# Patient Record
Sex: MALE | Race: OTHER | NOT HISPANIC OR LATINO | URBAN - METROPOLITAN AREA
[De-identification: names, ages, dates, MRNs, and addresses within clinical notes are randomized per-mention and may not be internally consistent; named-entity substitution may affect disease eponyms.]

---

## 2017-01-19 ENCOUNTER — EMERGENCY (EMERGENCY)
Facility: HOSPITAL | Age: 32
LOS: 1 days | Discharge: HOME | End: 2017-01-19
Attending: EMERGENCY MEDICINE | Admitting: EMERGENCY MEDICINE
Payer: SELF-PAY

## 2017-01-19 VITALS
DIASTOLIC BLOOD PRESSURE: 86 MMHG | OXYGEN SATURATION: 97 % | SYSTOLIC BLOOD PRESSURE: 118 MMHG | RESPIRATION RATE: 16 BRPM | HEART RATE: 90 BPM | TEMPERATURE: 98 F | WEIGHT: 165.35 LBS

## 2017-01-19 DIAGNOSIS — T15.91XA FOREIGN BODY ON EXTERNAL EYE, PART UNSPECIFIED, RIGHT EYE, INITIAL ENCOUNTER: ICD-10-CM
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
## 2017-01-19 DIAGNOSIS — H57.11 OCULAR PAIN, RIGHT EYE: ICD-10-CM
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
## 2017-01-19 PROCEDURE — 99284 EMERGENCY DEPT VISIT MOD MDM: CPT

## 2017-01-19 NOTE — ED ADULT TRIAGE NOTE - CHIEF COMPLAINT QUOTE
piece of metal in right eye when using  - uncorrected  Rt eye 20/40; uncorrected left eye 20/25; does wear glasses but "they are in the car"

## 2017-01-19 NOTE — ED ADULT NURSE NOTE - CHPI ED SYMPTOMS NEG
no numbness/no blurred vision/no vomiting/no fever/no syncope/no nausea/no loss of consciousness/no change in level of consciousness/no chills

## 2017-01-19 NOTE — ED PROVIDER NOTE - MEDICAL DECISION MAKING DETAILS
30 y/o m with metallic foreign body in right eye; visual acuity intact, occurred at 10AM today, spoke to Dr. Polanco ophthalmology at [facility], will send pt to her for evaluation.

## 2017-01-19 NOTE — ED ADULT NURSE NOTE - OBJECTIVE STATEMENT
Pt walked into ED with c/o of right foreign metal object with redness and tearing noted. Pt. states onset was this morning at 9 am after using a metal machine. PT. denies taking anything for the pain. PT. denies blurred vision, double vision.

## 2017-01-19 NOTE — ED PROVIDER NOTE - OBJECTIVE STATEMENT
32 y/o m no pmh presents c/o pain to right eye, tearing after metal shard flew into his eye while working.  Pt was drilling metal, reportedly wearing eye protection, but metal still got in, happened at ~10AM.

## 2017-01-19 NOTE — ED PROVIDER NOTE - EYES, MLM
right eye tearing, +sclera injection, PERRLA, EOMI, fluorescein stain shows small foreign body to cornea at 7 o'clock, visual acuity 20/30 uncorrected

## 2018-05-30 ENCOUNTER — EMERGENCY (EMERGENCY)
Facility: HOSPITAL | Age: 33
LOS: 1 days | Discharge: HOME | End: 2018-05-30
Attending: EMERGENCY MEDICINE | Admitting: EMERGENCY MEDICINE
Payer: SELF-PAY

## 2018-05-30 VITALS
TEMPERATURE: 98 F | HEART RATE: 84 BPM | SYSTOLIC BLOOD PRESSURE: 117 MMHG | HEIGHT: 68 IN | RESPIRATION RATE: 18 BRPM | OXYGEN SATURATION: 96 % | WEIGHT: 179.9 LBS | DIASTOLIC BLOOD PRESSURE: 82 MMHG

## 2018-05-30 PROCEDURE — 99284 EMERGENCY DEPT VISIT MOD MDM: CPT | Mod: 25

## 2018-05-30 PROCEDURE — 99284 EMERGENCY DEPT VISIT MOD MDM: CPT

## 2018-05-30 PROCEDURE — 70480 CT ORBIT/EAR/FOSSA W/O DYE: CPT | Mod: 26

## 2018-05-30 PROCEDURE — 70480 CT ORBIT/EAR/FOSSA W/O DYE: CPT

## 2018-05-30 RX ORDER — ERYTHROMYCIN BASE 5 MG/GRAM
1 OINTMENT (GRAM) OPHTHALMIC (EYE) ONCE
Qty: 0 | Refills: 0 | Status: COMPLETED | OUTPATIENT
Start: 2018-05-30 | End: 2018-05-30

## 2018-05-30 RX ORDER — ERYTHROMYCIN BASE 5 MG/GRAM
1 OINTMENT (GRAM) OPHTHALMIC (EYE)
Qty: 3.5 | Refills: 0 | OUTPATIENT
Start: 2018-05-30 | End: 2018-06-03

## 2018-05-30 RX ORDER — IBUPROFEN 200 MG
600 TABLET ORAL ONCE
Qty: 0 | Refills: 0 | Status: COMPLETED | OUTPATIENT
Start: 2018-05-30 | End: 2018-05-30

## 2018-05-30 RX ADMIN — Medication 600 MILLIGRAM(S): at 17:48

## 2018-05-30 RX ADMIN — Medication 1 APPLICATION(S): at 19:31

## 2018-05-30 NOTE — ED PROVIDER NOTE - MEDICAL DECISION MAKING DETAILS
corneal abrasion.  fb not seen.  eye irrigated.  ct done to eval deep metal fragment given mechanism of cutting metal pipe and neg.  home with erythromycin ointment and prn optho if symptoms persist

## 2018-05-30 NOTE — ED PROVIDER NOTE - OBJECTIVE STATEMENT
here with foreign body sensation/ pain in left eye after using sawsall on metal pipe.  Was wearing eye protection but says head was turned to side.  Vision unchanged.  Pain with movements of eye

## 2018-05-30 NOTE — ED PROVIDER NOTE - EYES, MLM
pupils equal, round and reactive to light.  left eye injected.  clear tearing.  no visualized foreign body.  lids everted.  fluorescein + with small area of uptake on medial cornea.  vision normal

## 2018-05-30 NOTE — ED ADULT NURSE NOTE - OBJECTIVE STATEMENT
was sawing metal , was wearing goggles and metal pieces got into his eyes, especially the left eye; did wash eyes with water PTA;
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
## 2018-06-03 DIAGNOSIS — H57.12 OCULAR PAIN, LEFT EYE: ICD-10-CM
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
## 2018-06-03 DIAGNOSIS — S05.02XA INJURY OF CONJUNCTIVA AND CORNEAL ABRASION WITHOUT FOREIGN BODY, LEFT EYE, INITIAL ENCOUNTER: ICD-10-CM
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
## 2020-05-12 NOTE — ED ADULT TRIAGE NOTE - STATUS:
"Blood sugars in [facility] encounter from 5/11/20 as attachment.    Obed Wiley is a 33 year old male who is being evaluated via a billable telephone visit.      The patient has been notified of following:     \"This telephone visit will be conducted via a call between you and your physician/provider. We have found that certain health care needs can be provided without the need for a physical exam.  This service lets us provide the care you need with a short phone conversation.  If a prescription is necessary we can send it directly to your pharmacy.  If lab work is needed we can place an order for that and you can then stop by our lab to have the test done at a later time.    Telephone visits are billed at different rates depending on your insurance coverage. During this emergency period, for some insurers they may be billed the same as an in-person visit.  Please reach out to your insurance provider with any questions.    If during the course of the call the physician/provider feels a telephone visit is not appropriate, you will not be charged for this service.\"    Patient has given verbal consent for Telephone visit?  Yes    What phone number would you like to be contacted at? [phone]    How would you like to obtain your AVS? [facility]     Endocrinology Telephone/virtual Visit    Chief Complaint: Consult and Diabetes     Information obtained from:Patient    Subjective:         HPI: Obed Wiley is a 33 year old male with history of diabetes who is seen in consultation at Anjelica Reyes's request for the same.    This is patient with complex medical history who was diagnosed with diabetes about 4 years ago.  He has been on insulin since then.  Current antidiabetic medication is as follows-  Lantus 15 units in the morning and 5 units at bedtime  Correction scale 2 hours after eating which involves-blood sugar 150 take 2 units, blood sugar 200 take 4 units, for blood sugar above 401 unit for every 50.    He is " very much frustrated because every time he eats his blood sugars goes up and has to ronal it down with a correction scale.  The main reason this appointment is a scheduled is to see if there is a way to avoid high blood sugars.  No history of DKA.  He has history of pancreatitis.  He tries to limit carbohydrate intake with meals to 20-30 g per meal.  Limits snacks to about 15 g per meal.  Because of his history of gastric bypass surgery-he usually eats multiple meals throughout the day.  As documented below in the copied document:    His fasting blood sugar readings are usually ranging between .  Postprandial blood sugars range between 200s-400s depending on the meals he eats.    He has scanned his blood sugar readings and has sent to us which is copied below.               Allergies   Allergen Reactions     Bee Anaphylaxis     Adhesive Tape Rash     Sulfa Drugs Itching     Morphine        Current Outpatient Medications   Medication Sig Dispense Refill     blood glucose (NO BRAND SPECIFIED) lancets standard Use to test blood sugar 6 times daily or as directed.       Calcium Carbonate Antacid 400 MG CHEW Take 400 mg by mouth 2 times daily        cholecalciferol (VITAMIN D3) 45585 units (1250 mcg) capsule Take 50,000 Units by mouth every 7 days Saturdays       ciprofloxacin (CIPRO) 500 MG tablet Take 500 mg by mouth daily       diclofenac (VOLTAREN) 1 % topical gel Place onto the skin 4 times daily as needed for moderate pain       enoxaparin ANTICOAGULANT (LOVENOX) 100 MG/ML syringe Inject 0.88 mLs (88 mg) Subcutaneous every 24 hours for 4 doses 3.52 mL 0     ferrous sulfate (FEROSUL) 325 (65 Fe) MG tablet Take 325 mg by mouth daily (with breakfast)       folic acid (FOLVITE) 1 MG tablet Take 1 mg by mouth 2 times daily       furosemide (LASIX) 40 MG tablet Take 40 mg by mouth 3 times daily       gabapentin (NEURONTIN) 400 MG capsule Take 400 mg by mouth 3 times daily       insulin aspart (NOVOLOG FLEXPEN) 100  UNIT/ML pen Inject Subcutaneous 3 times daily (with meals) Sliding scale as directed       insulin glargine (LANTUS PEN) 100 UNIT/ML pen Inject 26 Units Subcutaneous 2 times daily       lactulose (CHRONULAC) 10 GM/15ML solution Take 15 mLs (10 g) by mouth every hour as needed for constipation 946 mL 0     magnesium oxide 400 MG CAPS Take 400 mg by mouth 3 times daily        MULTIPLE VITAMIN-FOLIC ACID PO Take 1 tablet by mouth 1 daily       Nutritional Supplements (ENSURE PO) Ensure/ boost - 237 ml bid daily       oxyCODONE-acetaminophen (PERCOCET)  MG per tablet Take 1 tablet by mouth every 4 hours as needed for moderate to severe pain        pantoprazole (PROTONIX) 40 MG EC tablet Take 40 mg by mouth daily       polyethylene glycol (MIRALAX/GLYCOLAX) packet Take 1 packet by mouth daily        promethazine (PHENERGAN) 12.5 MG tablet Take 25 mg by mouth every 6 hours as needed for nausea       QUEtiapine (SEROQUEL) 25 MG tablet Take 50 mg by mouth At Bedtime       QUEtiapine (SEROQUEL) 25 MG tablet Take 25 mg by mouth daily as needed        rifaximin (XIFAXAN) 550 MG TABS tablet Take 550 mg by mouth 2 times daily        senna (SENOKOT) 8.6 MG tablet Take 1 tablet by mouth daily as needed for constipation       spironolactone (ALDACTONE) 100 MG tablet Take 100 mg by mouth 3 times daily       traZODone (DESYREL) 50 MG tablet Take 100 mg by mouth At Bedtime Prn        tretinoin (RETIN-A) 0.025 % external cream Apply topically At Bedtime       Vitamin D, Cholecalciferol, 25 MCG (1000 UT) CAPS Take 2,000 Units by mouth daily        lactulose (CHRONULAC) 10 GM/15ML solution Take 45 mLs (30 g) by mouth 3 times daily 20mg / 30 ml tid daily/ prn (Patient not taking: Reported on 4/27/2020)         Review of Systems     as per HPI above    Past Medical History:   Diagnosis Date     Alcoholic cirrhosis of liver with ascites (H) 12/23/2019     Diabetes (H)     Type 2 DM, Uses Insulin      DVT (deep vein thrombosis) in  pregnancy      H/O protein C deficiency      Hepatic encephalopathy (H)      Hepatitis     Hep A when an infant      History of blood transfusion     2019 at [facility]      Leukopenia 1/11/2020     SBP (spontaneous bacterial peritonitis) (H) 11/2019       Past Surgical History:   Procedure Laterality Date     ABDOMEN SURGERY      Gastric Bypass 2009. [facility]      CARDIAC SURGERY      IVC      COLONOSCOPY       ENT SURGERY      Tonsils and Adenoids Removed at 6-7 Years Old      GALLBLADDER SURGERY  2017     GI SURGERY      Upper GI        Family History   Problem Relation Age of Onset     Protein C deficiency Mother      Pancreatic Cancer Father      Deonte Parkinson White syndrome Father      Alcoholism Brother      Substance Abuse Brother      Heart Failure Maternal Grandmother      Heart Failure Maternal Grandfather          Objective:   Alert and oriented.  Pleasant.  In House Labs:       TSH   Date Value Ref Range Status   01/06/2020 4.09 (H) 0.40 - 4.00 mU/L Final     T4 Free   Date Value Ref Range Status   01/06/2020 0.97 0.76 - 1.46 ng/dL Final       Creatinine   Date Value Ref Range Status   01/11/2020 0.65 (L) 0.66 - 1.25 mg/dL Final   ]    Recent Labs   Lab Test 01/06/20  0831   CHOL 107   HDL 67   LDL 31   TRIG 49       No results found for: [id number], [id number], [id number]      Assessment/Treatment Plan:      Diabetes unspecified type: Based on his insulin need and sensitivity; he might be type 1 diabetes or with secondary diabetes.  To clarify this further we will going to check with his next blood work C-peptide, glucose and AKANKSHA antibodies.    In the meantime as documented above in the HPI his fasting blood sugars are within the normal limit however his postmeal blood sugar is high because of no coverage with meals.  Usually her blood sugars are changed after the fact with the correction scale.  I had a detailed discussion with him regarding diabetes management.  As a first step to avoid the  Applied blood sugar readings in the 300s and 400s postmeal; we will introduce mealtime insulin as documented below.  We are going to also formulate scheduled correction scale that will be implemented 3 times a day with main meals.  I have advised to reduce Lantus to 15 units daily from the current dose of 20 units daily because of the morning blood sugar fasting sometimes going in the 60s.  We will introduce mealtime coverage 1 unit for every 15 g of carbohydrate correctional scale 1 unit for every 65 above 151.  Will make further adjustments as we go along and will closely follow him up until blood sugars are under good control.  This patient benefits from having continuous glucose monitor-he is currently checking at least 4 times per day.  Going forward he needs to check his blood sugars multiple times due to pretibial diabetes and the use of multiple daily injections of insulin.  I have recommended stefano freestyle 14-day continuous glucose monitor and to this effect I have provided him with a letter that can help you get the prescription for CGM.  The following written information was shared via my chart with the patient.  Patient Instructions     Continue with Lantus 15 units daily, stop Lantus at bedtime 5 units daily.    Start mealtime insulin coverage with 1 unit of NovoLog for every 15 g of carbohydrates with meals 3 times per day.    Please follow this correction scale with meals 3 times a day  BG less than 151 - no additional insulin. Take only mealtime coverage.   -215 = additional 1 unit with mealtime insulin.   -280 = 2 units.  -345 = 3 units.  -410 = 4 units.  -475 = 5 units.  BG >475 = 6 units.    Please monitor your blood sugar at least 4 times per day and contact us if you have blood sugar below 70 on more than two occasions.       Please see the letter below (we will mail out this letter to you as well).     Patient:  Obed Wiley  :   1987  MRN:      [id number]        Mr.Aaron LUDMILA Wiley  [address] [facility] [address]      To whom it may concern    May 12, 2020      Mr. Wiley is under my care for diabetes management and has brittle diabetes which is currently being managed with multiple daily injections of insulin.  Due to wide fluctuations in his blood sugars, risk of hypoglycemia and the need to closely monitor his blood sugars multiple times throughout the day; I I have recommended a continuous glucose monitoring system for blood sugar monitoring.  To this effect; I highly appreciate your input in ordering the freestyle 14-day stefano device and sensors for Mr. Wiley.      Please do not hesitate to contact me if you need additional information.        Waylon Leon MD            I will contact the patient with the test results.  Return to clinic in 1.5 months.    Test and/or medications prescribed today:  Orders Placed This Encounter   Procedures     Glucose     C-peptide     Glutamic acid decarboxylase antibody     TSH with free T4 reflex         Waylon Leon MD  Staff Endocrinologist    [phone]  Division of Endocrinology and Diabetes          Phone call duration: 28 minutes

## 2020-07-21 ENCOUNTER — NON-APPOINTMENT (OUTPATIENT)
Age: 35
End: 2020-07-21

## 2020-07-21 ENCOUNTER — APPOINTMENT (OUTPATIENT)
Dept: OPHTHALMOLOGY | Facility: CLINIC | Age: 35
End: 2020-07-21
Payer: COMMERCIAL

## 2020-07-21 PROBLEM — Z00.00 ENCOUNTER FOR PREVENTIVE HEALTH EXAMINATION: Status: ACTIVE | Noted: 2020-07-21

## 2020-07-21 PROCEDURE — 65222 REMOVE FOREIGN BODY FROM EYE: CPT | Mod: LT

## 2020-07-24 ENCOUNTER — APPOINTMENT (OUTPATIENT)
Dept: OPHTHALMOLOGY | Facility: CLINIC | Age: 35
End: 2020-07-24

## 2021-04-02 ENCOUNTER — OUTPATIENT (OUTPATIENT)
Dept: OUTPATIENT SERVICES | Facility: HOSPITAL | Age: 36
LOS: 1 days | End: 2021-04-02
Payer: COMMERCIAL

## 2021-04-02 PROCEDURE — U0003: CPT

## 2021-04-02 PROCEDURE — U0005: CPT

## 2021-04-03 LAB — SARS-COV-2 RNA SPEC QL NAA+PROBE: SIGNIFICANT CHANGE UP

## 2021-04-06 DIAGNOSIS — Z11.52 ENCOUNTER FOR SCREENING FOR COVID-19: ICD-10-CM

## 2022-02-23 ENCOUNTER — NON-APPOINTMENT (OUTPATIENT)
Age: 37
End: 2022-02-23

## 2022-02-23 ENCOUNTER — APPOINTMENT (OUTPATIENT)
Dept: OPHTHALMOLOGY | Facility: CLINIC | Age: 37
End: 2022-02-23
Payer: COMMERCIAL

## 2022-02-23 PROCEDURE — 92012 INTRM OPH EXAM EST PATIENT: CPT

## 2022-02-25 ENCOUNTER — APPOINTMENT (OUTPATIENT)
Dept: OPHTHALMOLOGY | Facility: CLINIC | Age: 37
End: 2022-02-25

## 2022-03-09 NOTE — ED ADULT TRIAGE NOTE - BMI (KG/M2)
Quality 130: Documentation Of Current Medications In The Medical Record: Current Medications Documented Detail Level: Detailed Additional Notes: No medications reported 27.4

## 2025-05-16 ENCOUNTER — NON-APPOINTMENT (OUTPATIENT)
Age: 40
End: 2025-05-16